# Patient Record
Sex: MALE | Race: BLACK OR AFRICAN AMERICAN | NOT HISPANIC OR LATINO | Employment: FULL TIME | URBAN - METROPOLITAN AREA
[De-identification: names, ages, dates, MRNs, and addresses within clinical notes are randomized per-mention and may not be internally consistent; named-entity substitution may affect disease eponyms.]

---

## 2018-01-24 ENCOUNTER — OFFICE VISIT (OUTPATIENT)
Dept: OPHTHALMOLOGY | Facility: CLINIC | Age: 63
End: 2018-01-24
Payer: COMMERCIAL

## 2018-01-24 DIAGNOSIS — H52.7 REFRACTIVE ERROR: ICD-10-CM

## 2018-01-24 DIAGNOSIS — E11.9 DIABETES MELLITUS TYPE 2 WITHOUT RETINOPATHY: Primary | ICD-10-CM

## 2018-01-24 DIAGNOSIS — H26.9 CORTICAL CATARACT OF BOTH EYES: ICD-10-CM

## 2018-01-24 PROCEDURE — 99999 PR PBB SHADOW E&M-EST. PATIENT-LVL II: CPT | Mod: PBBFAC,,, | Performed by: OPHTHALMOLOGY

## 2018-01-24 PROCEDURE — 92015 DETERMINE REFRACTIVE STATE: CPT | Mod: S$GLB,,, | Performed by: OPHTHALMOLOGY

## 2018-01-24 PROCEDURE — 92014 COMPRE OPH EXAM EST PT 1/>: CPT | Mod: S$GLB,,, | Performed by: OPHTHALMOLOGY

## 2018-01-24 RX ORDER — AMLODIPINE BESYLATE 5 MG/1
5 TABLET ORAL
COMMUNITY
Start: 2017-12-18 | End: 2022-08-11

## 2018-01-24 RX ORDER — ATORVASTATIN CALCIUM 20 MG/1
20 TABLET, FILM COATED ORAL
COMMUNITY
Start: 2017-12-18 | End: 2020-08-03

## 2018-01-24 RX ORDER — GLIMEPIRIDE 4 MG/1
4 TABLET ORAL
COMMUNITY
Start: 2017-12-18

## 2018-01-24 RX ORDER — LOSARTAN POTASSIUM AND HYDROCHLOROTHIAZIDE 25; 100 MG/1; MG/1
1 TABLET ORAL
COMMUNITY
Start: 2017-12-18 | End: 2018-02-07

## 2018-01-24 RX ORDER — METFORMIN HYDROCHLORIDE 500 MG/1
500 TABLET ORAL
COMMUNITY
Start: 2017-12-18

## 2018-01-24 NOTE — LETTER
[facility] - Ophthalmology  [address] [address]  Phone: [phone]  Fax: [phone]   January 24, 2018    Bert Cr MD  [address] [facility] [address] [address]    Patient: Son Zuniga   MR Number: [id number]   YOB: 1955   Date of Visit: 1/24/2018       Dear Dr. Cr :    Thank you for referring Son Zuniga to me for evaluation. Here is my assessment and plan of care:          1. Diabetes mellitus type 2 without retinopathy No diabetic retinopathy at this time. Reviewed diabetic eye precautions including avoiding tobacco use,  Good glucose control, and importance of regular follow up.      2. Cortical cataract of both eyes monitor for now   3. Refractive error distance rx     RTC in 1 year or prn any changes          If you have questions, please do not hesitate to call me. I look forward to following Mr. Son Zuniga along with you.    Sincerely,        Santo Mckinnon MD       CC  No Recipients

## 2018-01-24 NOTE — PROGRESS NOTES
SUBJECTIVE:   Son Zuniga is a 62 y.o. male   Corrected distance visual acuity was 20/30 +1 in the right eye and 20/20 -1 in the left eye.   Chief Complaint   Patient presents with    Diabetic Eye Exam     Latest BS reading was 152 this morning.        HPI:  HPI     Diabetic Eye Exam    Additional comments: Latest BS reading was 152 this morning.           Comments   Pt here for annual Dm exam. Pt states that he doesn't think his glasses   are as good as they used to be. He is interested in the Crizal lens. He   says he's noticed his right eye's vision has been fuzzy for a long time.   No pain or discomfort.     PCP: Bert Cr    Referred by Chelsea Zuniga  DM 2012  H/O retinal cryo but pt ? Which eye and RD?       Last edited by Isael Dias, Patient Care Assistant on 1/24/2018  3:00   PM. (History)        Assessment /Plan :  1. Diabetes mellitus type 2 without retinopathy No diabetic retinopathy at this time. Reviewed diabetic eye precautions including avoiding tobacco use,  Good glucose control, and importance of regular follow up.      2. Cortical cataract of both eyes monitor for now   3. Refractive error distance rx     RTC in 1 year or prn any changes

## 2018-02-07 ENCOUNTER — OFFICE VISIT (OUTPATIENT)
Dept: OPHTHALMOLOGY | Facility: CLINIC | Age: 63
End: 2018-02-07
Payer: COMMERCIAL

## 2018-02-07 DIAGNOSIS — H52.7 REFRACTIVE ERROR: Primary | ICD-10-CM

## 2018-02-07 PROCEDURE — 99999 PR PBB SHADOW E&M-EST. PATIENT-LVL II: CPT | Mod: PBBFAC,,, | Performed by: OPHTHALMOLOGY

## 2018-02-07 PROCEDURE — 99499 UNLISTED E&M SERVICE: CPT | Mod: S$GLB,,, | Performed by: OPHTHALMOLOGY

## 2018-02-07 RX ORDER — LOSARTAN POTASSIUM AND HYDROCHLOROTHIAZIDE 25; 100 MG/1; MG/1
TABLET ORAL
COMMUNITY
Start: 2017-12-18

## 2018-02-07 NOTE — PROGRESS NOTES
SUBJECTIVE:   Son Zuniga is a 62 y.o. male   Corrected distance visual acuity was 20/20 in the right eye and 20/25 +2 in the left eye.   Chief Complaint   Patient presents with    Glasses     Rx check        HPI:  HPI     Glasses    Additional comments: Rx check           Comments   Pt here for glasses check. He says that the right eye is fine, but the   left eye is a little weak in his glasses. He says it's the far vision in   his left eye at night. He says that the light helps with his sight. His   blood sugar was 144. Normally around 100.    PCP: Bert Cr    Referred by Chelsea Zuniga    1. DM 2012  2. H/O retinal cryo but pt ? Which eye and RD?  3. Cataract OS>OD       Last edited by Isael Dias, Patient Care Assistant on 2/7/2018  3:58   PM. (History)        Assessment /Plan :  1. Refractive error change left lens     RTC as scheduled or prn

## 2019-04-04 ENCOUNTER — OFFICE VISIT (OUTPATIENT)
Dept: OPHTHALMOLOGY | Facility: CLINIC | Age: 64
End: 2019-04-04
Payer: COMMERCIAL

## 2019-04-04 DIAGNOSIS — H26.9 CORTICAL CATARACT OF BOTH EYES: ICD-10-CM

## 2019-04-04 DIAGNOSIS — E11.9 DIABETES MELLITUS TYPE 2 WITHOUT RETINOPATHY: Primary | ICD-10-CM

## 2019-04-04 DIAGNOSIS — H25.13 NUCLEAR SCLEROSIS OF BOTH EYES: ICD-10-CM

## 2019-04-04 DIAGNOSIS — H31.003 CHORIORETINAL SCAR OF BOTH EYES: ICD-10-CM

## 2019-04-04 PROCEDURE — 99999 PR PBB SHADOW E&M-EST. PATIENT-LVL II: CPT | Mod: PBBFAC,,, | Performed by: OPHTHALMOLOGY

## 2019-04-04 PROCEDURE — 92014 COMPRE OPH EXAM EST PT 1/>: CPT | Mod: S$GLB,,, | Performed by: OPHTHALMOLOGY

## 2019-04-04 PROCEDURE — 99999 PR PBB SHADOW E&M-EST. PATIENT-LVL II: ICD-10-PCS | Mod: PBBFAC,,, | Performed by: OPHTHALMOLOGY

## 2019-04-04 PROCEDURE — 92014 PR EYE EXAM, EST PATIENT,COMPREHESV: ICD-10-PCS | Mod: S$GLB,,, | Performed by: OPHTHALMOLOGY

## 2019-04-04 NOTE — PROGRESS NOTES
SUBJECTIVE:   Son Zuniga is a 63 y.o. male   Uncorrected distance visual acuity was 20/30 in the right eye and 20/30 in the left eye.   Chief Complaint   Patient presents with    Diabetic Eye Exam     Yearly        HPI:  HPI     Diabetic Eye Exam      Additional comments: Yearly              Comments     Patient States No Visual Complaints & No Discomfort.    PCP: Bert Cr    Referred by Chelsea Coronelper    1. DM 2012  2. H/O retinal cryo but pt ? Which eye and RD?  3. Cataract OS>OD          Last edited by Maritza Roach on 4/4/2019  1:39 PM. (History)        Assessment /Plan :  1. Diabetes mellitus type 2 without retinopathy No diabetic retinopathy at this time. Reviewed diabetic eye precautions including avoiding tobacco use,  Good glucose control, and importance of regular follow up.      2. Cortical cataract of both eyes monitor for now   3. Nuclear sclerosis of both eyes monitor for now   4. Chorioretinal scar of both eyes  -- Condition stable, no therapeutic change required. Monitoring routinely.     RTC in 1 year or prn any changes

## 2019-04-04 NOTE — LETTER
[facility] - Ophthalmology  [address] [address]  Phone: [phone]  Fax: [phone]   April 4, 2019    Bert Cr MD  [address] [facility] [address]    Patient: Son Zuniga   MR Number: [id number]   YOB: 1955   Date of Visit: 4/4/2019       Dear Dr. Cr :    Thank you for referring Son Zuniga to me for evaluation. Here is my assessment and plan of care:          1. Diabetes mellitus type 2 without retinopathy No diabetic retinopathy at this time. Reviewed diabetic eye precautions including avoiding tobacco use,  Good glucose control, and importance of regular follow up.      2. Cortical cataract of both eyes monitor for now   3. Nuclear sclerosis of both eyes monitor for now   4. Chorioretinal scar of both eyes  -- Condition stable, no therapeutic change required. Monitoring routinely.     RTC in 1 year or prn any changes                If you have questions, please do not hesitate to call me. I look forward to following Mr. Son Zuniga along with you.    Sincerely,        Santo Mckinnon MD       CC  No Recipients

## 2020-08-03 ENCOUNTER — OFFICE VISIT (OUTPATIENT)
Dept: OPHTHALMOLOGY | Facility: CLINIC | Age: 65
End: 2020-08-03
Payer: COMMERCIAL

## 2020-08-03 DIAGNOSIS — E11.9 DIABETES MELLITUS TYPE 2 WITHOUT RETINOPATHY: Primary | ICD-10-CM

## 2020-08-03 DIAGNOSIS — H26.9 CORTICAL CATARACT OF BOTH EYES: ICD-10-CM

## 2020-08-03 DIAGNOSIS — H25.13 NUCLEAR SCLEROSIS OF BOTH EYES: ICD-10-CM

## 2020-08-03 DIAGNOSIS — H31.003 CHORIORETINAL SCAR OF BOTH EYES: ICD-10-CM

## 2020-08-03 PROCEDURE — 99999 PR PBB SHADOW E&M-EST. PATIENT-LVL III: CPT | Mod: PBBFAC,,, | Performed by: OPHTHALMOLOGY

## 2020-08-03 PROCEDURE — 92014 PR EYE EXAM, EST PATIENT,COMPREHESV: ICD-10-PCS | Mod: S$GLB,,, | Performed by: OPHTHALMOLOGY

## 2020-08-03 PROCEDURE — 92014 COMPRE OPH EXAM EST PT 1/>: CPT | Mod: S$GLB,,, | Performed by: OPHTHALMOLOGY

## 2020-08-03 PROCEDURE — 99999 PR PBB SHADOW E&M-EST. PATIENT-LVL III: ICD-10-PCS | Mod: PBBFAC,,, | Performed by: OPHTHALMOLOGY

## 2020-08-03 RX ORDER — ATORVASTATIN CALCIUM 20 MG/1
20 TABLET, FILM COATED ORAL
COMMUNITY
Start: 2020-03-30

## 2020-08-03 NOTE — LETTER
[facility]  [address] [address]  Phone: [phone]  Fax: [phone]   August 3, 2020    Bert Cr MD  [address] [address]    Patient: Son Zuniga   MR Number: [id number]   YOB: 1955   Date of Visit: 8/3/2020       Dear Dr. Cr :    Thank you for referring Son Zuniga to me for evaluation. Here is my assessment and plan of care:    Assessment  /Plan :  1. Diabetes mellitus type 2 without retinopathy No diabetic retinopathy at this time. Reviewed diabetic eye precautions including avoiding tobacco use,  Good glucose control, and importance of regular follow up.      2. Cortical cataract of both eyes monitor for now   3. Nuclear sclerosis of both eyes monitor for now   4. Chorioretinal scar of both eyes  -- Condition stable, no therapeutic change required. Monitoring routinely.       RTC in 1 year or prn any changes          If you have questions, please do not hesitate to call me. I look forward to following Mr. Son Zuniga along with you.    Sincerely,        Santo Mckinnon MD       CC  No Recipients

## 2020-08-03 NOTE — PROGRESS NOTES
SUBJECTIVE  Son Zuniga is 64 y.o. male  Corrected distance visual acuity was 20/25 -2 in the right eye and 20/20 in the left eye.   Chief Complaint   Patient presents with    Diabetic Eye Exam     1 year RTC          HPI     Diabetic Eye Exam      Additional comments: 1 year RTC              Comments     Pt states no problems since his last visit. Not using any drops. No   ocular pain or irritation. Vision is about the same with his glasses.     PCP: Bert Cr    Referred by Chelsea Zuniga    1. DM 2012  2. H/O retinal cryo but pt ? Which eye and RD?  3. Cataract OS>OD          Last edited by James King on 8/3/2020  2:42 PM. (History)         Assessment /Plan :  1. Diabetes mellitus type 2 without retinopathy No diabetic retinopathy at this time. Reviewed diabetic eye precautions including avoiding tobacco use,  Good glucose control, and importance of regular follow up.      2. Cortical cataract of both eyes monitor for now   3. Nuclear sclerosis of both eyes monitor for now   4. Chorioretinal scar of both eyes  -- Condition stable, no therapeutic change required. Monitoring routinely.       RTC in 1 year or prn any changes

## 2020-10-19 ENCOUNTER — TELEPHONE (OUTPATIENT)
Dept: OPHTHALMOLOGY | Facility: CLINIC | Age: 65
End: 2020-10-19

## 2020-10-19 NOTE — TELEPHONE ENCOUNTER
----- Message from Mary Merrill sent at 10/19/2020  3:16 PM CDT -----  Pt stated he lost his glasses and is needing his current prescription sent over to [facility]. Please call back at .[phone]   [facility] fax: [phone].

## 2021-08-09 ENCOUNTER — OFFICE VISIT (OUTPATIENT)
Dept: OPHTHALMOLOGY | Facility: CLINIC | Age: 66
End: 2021-08-09
Payer: COMMERCIAL

## 2021-08-09 DIAGNOSIS — H25.13 NUCLEAR SCLEROSIS OF BOTH EYES: ICD-10-CM

## 2021-08-09 DIAGNOSIS — H26.9 CORTICAL CATARACT OF BOTH EYES: ICD-10-CM

## 2021-08-09 DIAGNOSIS — E11.9 DIABETES MELLITUS TYPE 2 WITHOUT RETINOPATHY: Primary | ICD-10-CM

## 2021-08-09 DIAGNOSIS — H31.003 CHORIORETINAL SCAR OF BOTH EYES: ICD-10-CM

## 2021-08-09 PROCEDURE — 99999 PR PBB SHADOW E&M-EST. PATIENT-LVL III: CPT | Mod: PBBFAC,,, | Performed by: OPHTHALMOLOGY

## 2021-08-09 PROCEDURE — 99213 OFFICE O/P EST LOW 20 MIN: CPT | Mod: S$GLB,,, | Performed by: OPHTHALMOLOGY

## 2021-08-09 PROCEDURE — 1160F PR REVIEW ALL MEDS BY PRESCRIBER/CLIN PHARMACIST DOCUMENTED: ICD-10-PCS | Mod: CPTII,S$GLB,, | Performed by: OPHTHALMOLOGY

## 2021-08-09 PROCEDURE — 99213 PR OFFICE/OUTPT VISIT, EST, LEVL III, 20-29 MIN: ICD-10-PCS | Mod: S$GLB,,, | Performed by: OPHTHALMOLOGY

## 2021-08-09 PROCEDURE — 99999 PR PBB SHADOW E&M-EST. PATIENT-LVL III: ICD-10-PCS | Mod: PBBFAC,,, | Performed by: OPHTHALMOLOGY

## 2021-08-09 PROCEDURE — 1159F MED LIST DOCD IN RCRD: CPT | Mod: CPTII,S$GLB,, | Performed by: OPHTHALMOLOGY

## 2021-08-09 PROCEDURE — 1159F PR MEDICATION LIST DOCUMENTED IN MEDICAL RECORD: ICD-10-PCS | Mod: CPTII,S$GLB,, | Performed by: OPHTHALMOLOGY

## 2021-08-09 PROCEDURE — 1160F RVW MEDS BY RX/DR IN RCRD: CPT | Mod: CPTII,S$GLB,, | Performed by: OPHTHALMOLOGY

## 2022-08-11 ENCOUNTER — OFFICE VISIT (OUTPATIENT)
Dept: OPHTHALMOLOGY | Facility: CLINIC | Age: 67
End: 2022-08-11
Payer: COMMERCIAL

## 2022-08-11 DIAGNOSIS — H25.13 NUCLEAR SCLEROSIS OF BOTH EYES: ICD-10-CM

## 2022-08-11 DIAGNOSIS — E11.9 DIABETES MELLITUS TYPE 2 WITHOUT RETINOPATHY: Primary | ICD-10-CM

## 2022-08-11 DIAGNOSIS — H52.7 REFRACTIVE ERROR: ICD-10-CM

## 2022-08-11 DIAGNOSIS — H26.9 CORTICAL CATARACT OF BOTH EYES: ICD-10-CM

## 2022-08-11 PROCEDURE — 1159F PR MEDICATION LIST DOCUMENTED IN MEDICAL RECORD: ICD-10-PCS | Mod: CPTII,S$GLB,, | Performed by: OPHTHALMOLOGY

## 2022-08-11 PROCEDURE — 1160F PR REVIEW ALL MEDS BY PRESCRIBER/CLIN PHARMACIST DOCUMENTED: ICD-10-PCS | Mod: CPTII,S$GLB,, | Performed by: OPHTHALMOLOGY

## 2022-08-11 PROCEDURE — 2023F PR DILATED RETINAL EXAM W/O EVID OF RETINOPATHY: ICD-10-PCS | Mod: CPTII,S$GLB,, | Performed by: OPHTHALMOLOGY

## 2022-08-11 PROCEDURE — 92014 COMPRE OPH EXAM EST PT 1/>: CPT | Mod: S$GLB,,, | Performed by: OPHTHALMOLOGY

## 2022-08-11 PROCEDURE — 92015 PR REFRACTION: ICD-10-PCS | Mod: S$GLB,,, | Performed by: OPHTHALMOLOGY

## 2022-08-11 PROCEDURE — 92014 PR EYE EXAM, EST PATIENT,COMPREHESV: ICD-10-PCS | Mod: S$GLB,,, | Performed by: OPHTHALMOLOGY

## 2022-08-11 PROCEDURE — 99999 PR PBB SHADOW E&M-EST. PATIENT-LVL III: ICD-10-PCS | Mod: PBBFAC,,, | Performed by: OPHTHALMOLOGY

## 2022-08-11 PROCEDURE — 92015 DETERMINE REFRACTIVE STATE: CPT | Mod: S$GLB,,, | Performed by: OPHTHALMOLOGY

## 2022-08-11 PROCEDURE — 99999 PR PBB SHADOW E&M-EST. PATIENT-LVL III: CPT | Mod: PBBFAC,,, | Performed by: OPHTHALMOLOGY

## 2022-08-11 PROCEDURE — 2023F DILAT RTA XM W/O RTNOPTHY: CPT | Mod: CPTII,S$GLB,, | Performed by: OPHTHALMOLOGY

## 2022-08-11 PROCEDURE — 1160F RVW MEDS BY RX/DR IN RCRD: CPT | Mod: CPTII,S$GLB,, | Performed by: OPHTHALMOLOGY

## 2022-08-11 PROCEDURE — 1159F MED LIST DOCD IN RCRD: CPT | Mod: CPTII,S$GLB,, | Performed by: OPHTHALMOLOGY

## 2022-08-11 RX ORDER — IRBESARTAN AND HYDROCHLOROTHIAZIDE 300; 12.5 MG/1; MG/1
1 TABLET, FILM COATED ORAL DAILY
COMMUNITY
Start: 2022-07-12

## 2022-08-11 NOTE — LETTER
[facility]  [address] [facility] [address] [address]  Phone: [phone]  Fax: [phone]   August 11, 2022    Bert Cr MD  [address] [address]    Patient: Son Zuniga   MR Number: [id number]   YOB: 1955   Date of Visit: 8/11/2022       Dear Dr. Cr :    Thank you for referring Son Zuniga to me for evaluation. Here is my assessment and plan of care:    Assessment/Plan :  1. Diabetes mellitus type 2 without retinopathy No diabetic retinopathy at this time. Reviewed diabetic eye precautions including avoiding tobacco use,  Good glucose control, and importance of regular follow up.      2. Cortical cataract of both eyes  -- Condition stable, no therapeutic change required. Monitoring routinely.     3. Nuclear sclerosis of both eyes  -- Condition stable, no therapeutic change required. Monitoring routinely.     4. Refractive error - Dispensed SVL Rx           If you have questions, please do not hesitate to call me. I look forward to following Mr. Son Zuniga along with you.    Sincerely,        Santo Mckinnon MD       CC  No Recipients

## 2022-08-11 NOTE — PROGRESS NOTES
SUBJECTIVE  Son Zuniga is 66 y.o. male  Uncorrected distance visual acuity was 20/30 in the right eye and 20/20 in the left eye.   Chief Complaint   Patient presents with    Diabetic Eye Exam     Pt here for routine eye exam. Pt says he has noticed his near vision in his left eye has declined a little bit since last appointment. No pain or discomfort.           HPI     Diabetic Eye Exam      Additional comments: Pt here for routine eye exam. Pt says he has   noticed his near vision in his left eye has declined a little bit since   last appointment. No pain or discomfort.               Comments     1. DM 2012  2. H/O retinal cryo but pt ? Which eye and RD?  3. Cataract OS>OD            Last edited by Isael Dias MA on 8/11/2022  1:37 PM. (History)         Assessment /Plan :  1. Diabetes mellitus type 2 without retinopathy No diabetic retinopathy at this time. Reviewed diabetic eye precautions including avoiding tobacco use,  Good glucose control, and importance of regular follow up.      2. Cortical cataract of both eyes  -- Condition stable, no therapeutic change required. Monitoring routinely.     3. Nuclear sclerosis of both eyes  -- Condition stable, no therapeutic change required. Monitoring routinely.     4. Refractive error - Dispensed SVL Rx

## 2023-01-17 ENCOUNTER — TELEPHONE (OUTPATIENT)
Dept: OPHTHALMOLOGY | Facility: CLINIC | Age: 68
End: 2023-01-17
Payer: COMMERCIAL

## 2023-10-02 ENCOUNTER — OFFICE VISIT (OUTPATIENT)
Dept: OPHTHALMOLOGY | Facility: CLINIC | Age: 68
End: 2023-10-02
Payer: COMMERCIAL

## 2023-10-02 DIAGNOSIS — E11.9 DIABETES MELLITUS TYPE 2 WITHOUT RETINOPATHY: Primary | ICD-10-CM

## 2023-10-02 DIAGNOSIS — H26.9 CORTICAL CATARACT OF BOTH EYES: ICD-10-CM

## 2023-10-02 DIAGNOSIS — H25.13 NUCLEAR SCLEROSIS OF BOTH EYES: ICD-10-CM

## 2023-10-02 DIAGNOSIS — H52.7 REFRACTIVE ERROR: ICD-10-CM

## 2023-10-02 PROCEDURE — 1159F MED LIST DOCD IN RCRD: CPT | Mod: CPTII,S$GLB,, | Performed by: OPHTHALMOLOGY

## 2023-10-02 PROCEDURE — 92014 PR EYE EXAM, EST PATIENT,COMPREHESV: ICD-10-PCS | Mod: S$GLB,,, | Performed by: OPHTHALMOLOGY

## 2023-10-02 PROCEDURE — 92014 COMPRE OPH EXAM EST PT 1/>: CPT | Mod: S$GLB,,, | Performed by: OPHTHALMOLOGY

## 2023-10-02 PROCEDURE — 1160F PR REVIEW ALL MEDS BY PRESCRIBER/CLIN PHARMACIST DOCUMENTED: ICD-10-PCS | Mod: CPTII,S$GLB,, | Performed by: OPHTHALMOLOGY

## 2023-10-02 PROCEDURE — 2023F PR DILATED RETINAL EXAM W/O EVID OF RETINOPATHY: ICD-10-PCS | Mod: CPTII,S$GLB,, | Performed by: OPHTHALMOLOGY

## 2023-10-02 PROCEDURE — 92015 PR REFRACTION: ICD-10-PCS | Mod: S$GLB,,, | Performed by: OPHTHALMOLOGY

## 2023-10-02 PROCEDURE — 92015 DETERMINE REFRACTIVE STATE: CPT | Mod: S$GLB,,, | Performed by: OPHTHALMOLOGY

## 2023-10-02 PROCEDURE — 2023F DILAT RTA XM W/O RTNOPTHY: CPT | Mod: CPTII,S$GLB,, | Performed by: OPHTHALMOLOGY

## 2023-10-02 PROCEDURE — 1159F PR MEDICATION LIST DOCUMENTED IN MEDICAL RECORD: ICD-10-PCS | Mod: CPTII,S$GLB,, | Performed by: OPHTHALMOLOGY

## 2023-10-02 PROCEDURE — 1160F RVW MEDS BY RX/DR IN RCRD: CPT | Mod: CPTII,S$GLB,, | Performed by: OPHTHALMOLOGY

## 2023-10-02 PROCEDURE — 99999 PR PBB SHADOW E&M-EST. PATIENT-LVL III: ICD-10-PCS | Mod: PBBFAC,,, | Performed by: OPHTHALMOLOGY

## 2023-10-02 PROCEDURE — 99999 PR PBB SHADOW E&M-EST. PATIENT-LVL III: CPT | Mod: PBBFAC,,, | Performed by: OPHTHALMOLOGY

## 2023-10-02 NOTE — LETTER
[facility]  [address] [address]  Phone: [phone]  Fax: [phone]   October 2, 2023    Bert Cr MD  [address] [address]    Patient: Son Zuniga   MR Number: [id number]   YOB: 1955   Date of Visit: 10/2/2023       Dear Dr. Cr :    Thank you for referring Son Zuniga to me for evaluation. Here is my assessment and plan of care:    Assessment/Plan:  1. Diabetes mellitus type 2 without retinopathy - No diabetic retinopathy at this time. Reviewed diabetic eye precautions including avoiding tobacco use,  Good glucose control, and importance of regular follow up.      2. Cortical cataract of both eyes  -- Condition stable, no therapeutic change required. Monitoring routinely.     3. Nuclear sclerosis of both eyes  -- Condition stable, no therapeutic change required. Monitoring routinely.     4. Refractive error - Dispensed SVL Rx           If you have questions, please do not hesitate to call me. I look forward to following Mr. Son Zuniga along with you.    Sincerely,        Santo Mckinnon MD       CC  No Recipients

## 2023-10-02 NOTE — PROGRESS NOTES
SUBJECTIVE  Son Zuniga is 67 y.o. male  Uncorrected distance visual acuity was 20/30 -2 in the right eye and 20/25 -1 in the left eye.   Chief Complaint   Patient presents with    Annual Exam     Pt reports for yearly exam. Denies any pain or irritation. Va stable. No drops.           HPI     Annual Exam     Additional comments: Pt reports for yearly exam. Denies any pain or   irritation. Va stable. No drops.            Comments    1. DM 2012  2. H/O retinal cryo but pt ? Which eye and RD?  3. Cataract OS>OD            Last edited by Tru Solis on 10/2/2023  2:38 PM.         Assessment /Plan :  1. Diabetes mellitus type 2 without retinopathy - No diabetic retinopathy at this time. Reviewed diabetic eye precautions including avoiding tobacco use,  Good glucose control, and importance of regular follow up.      2. Cortical cataract of both eyes  -- Condition stable, no therapeutic change required. Monitoring routinely.     3. Nuclear sclerosis of both eyes  -- Condition stable, no therapeutic change required. Monitoring routinely.     4. Refractive error - Dispensed SVL Rx        RTC in 1 year or prn any changes

## 2025-01-08 ENCOUNTER — OFFICE VISIT (OUTPATIENT)
Dept: OPHTHALMOLOGY | Facility: CLINIC | Age: 70
End: 2025-01-08
Payer: COMMERCIAL

## 2025-01-08 DIAGNOSIS — H25.13 NUCLEAR SCLEROSIS OF BOTH EYES: ICD-10-CM

## 2025-01-08 DIAGNOSIS — E11.9 DIABETES MELLITUS TYPE 2 WITHOUT RETINOPATHY: Primary | ICD-10-CM

## 2025-01-08 DIAGNOSIS — H26.9 CORTICAL CATARACT OF BOTH EYES: ICD-10-CM

## 2025-01-08 PROCEDURE — 1159F MED LIST DOCD IN RCRD: CPT | Mod: CPTII,S$GLB,, | Performed by: OPHTHALMOLOGY

## 2025-01-08 PROCEDURE — 99999 PR PBB SHADOW E&M-EST. PATIENT-LVL III: CPT | Mod: PBBFAC,,, | Performed by: OPHTHALMOLOGY

## 2025-01-08 PROCEDURE — 1160F RVW MEDS BY RX/DR IN RCRD: CPT | Mod: CPTII,S$GLB,, | Performed by: OPHTHALMOLOGY

## 2025-01-08 PROCEDURE — 2023F DILAT RTA XM W/O RTNOPTHY: CPT | Mod: CPTII,S$GLB,, | Performed by: OPHTHALMOLOGY

## 2025-01-08 PROCEDURE — 92014 COMPRE OPH EXAM EST PT 1/>: CPT | Mod: S$GLB,,, | Performed by: OPHTHALMOLOGY

## 2025-01-08 NOTE — PROGRESS NOTES
SUBJECTIVE  Son Zuniga is 69 y.o. male  Uncorrected distance visual acuity was 20/30 in the right eye and 20/30 in the left eye.   Chief Complaint   Patient presents with    Annual Exam     Pt reports for yearly exam. Denies any pain or irritation. Va stable. No drops.           HPI     Annual Exam     Additional comments: Pt reports for yearly exam. Denies any pain or   irritation. Va stable. No drops.            Comments    1. DM 2012  2. H/O retinal cryo but pt ? Which eye and RD?  3. Cataract OS>OD             Last edited by Tru Solis on 1/8/2025  2:46 PM.         Assessment /Plan :  1. Diabetes mellitus type 2 without retinopathy No diabetic retinopathy at this time. Reviewed diabetic eye precautions including avoiding tobacco use,  Good glucose control, and importance of regular follow up.      2. Cortical cataract of both eyes - monitor for now   3. Nuclear sclerosis of both eyes - monitor for now         RTC in 1 year with Dr. Casillas or prn any changes